# Patient Record
Sex: MALE | Race: WHITE | URBAN - METROPOLITAN AREA
[De-identification: names, ages, dates, MRNs, and addresses within clinical notes are randomized per-mention and may not be internally consistent; named-entity substitution may affect disease eponyms.]

---

## 2018-03-07 PROBLEM — R73.03 PRE-DIABETES: Status: ACTIVE | Noted: 2018-03-07

## 2018-03-07 PROBLEM — E88.810 METABOLIC SYNDROME: Status: ACTIVE | Noted: 2018-03-07

## 2019-01-14 PROBLEM — I49.3 PVC (PREMATURE VENTRICULAR CONTRACTION): Status: ACTIVE | Noted: 2019-01-14

## 2024-06-03 RX ORDER — TORSEMIDE 10 MG/1
10 TABLET ORAL DAILY
COMMUNITY

## 2024-06-03 RX ORDER — CEPHALEXIN 500 MG/1
500 CAPSULE ORAL 2 TIMES DAILY
COMMUNITY

## 2024-06-11 ENCOUNTER — HOSPITAL ENCOUNTER (OUTPATIENT)
Facility: HOSPITAL | Age: 74
Setting detail: HOSPITAL OUTPATIENT SURGERY
Discharge: HOME | End: 2024-06-11
Attending: INTERNAL MEDICINE | Admitting: INTERNAL MEDICINE
Payer: MEDICARE

## 2024-06-11 ENCOUNTER — ANESTHESIA (OUTPATIENT)
Dept: ENDOSCOPY | Facility: HOSPITAL | Age: 74
End: 2024-06-11
Payer: MEDICARE

## 2024-06-11 ENCOUNTER — ANESTHESIA EVENT (OUTPATIENT)
Dept: ENDOSCOPY | Facility: HOSPITAL | Age: 74
End: 2024-06-11
Payer: MEDICARE

## 2024-06-11 VITALS
DIASTOLIC BLOOD PRESSURE: 75 MMHG | OXYGEN SATURATION: 95 % | SYSTOLIC BLOOD PRESSURE: 98 MMHG | HEIGHT: 72 IN | WEIGHT: 245 LBS | HEART RATE: 95 BPM | BODY MASS INDEX: 33.18 KG/M2 | RESPIRATION RATE: 15 BRPM

## 2024-06-11 DIAGNOSIS — R19.7 DIARRHEA, UNSPECIFIED: ICD-10-CM

## 2024-06-11 PROCEDURE — 88312 SPECIAL STAINS GROUP 1: CPT | Performed by: INTERNAL MEDICINE

## 2024-06-11 PROCEDURE — 88342 IMHCHEM/IMCYTCHM 1ST ANTB: CPT | Performed by: INTERNAL MEDICINE

## 2024-06-11 PROCEDURE — 0DBP8ZX EXCISION OF RECTUM, VIA NATURAL OR ARTIFICIAL OPENING ENDOSCOPIC, DIAGNOSTIC: ICD-10-PCS | Performed by: INTERNAL MEDICINE

## 2024-06-11 PROCEDURE — 88341 IMHCHEM/IMCYTCHM EA ADD ANTB: CPT | Performed by: INTERNAL MEDICINE

## 2024-06-11 PROCEDURE — 0DB78ZX EXCISION OF STOMACH, PYLORUS, VIA NATURAL OR ARTIFICIAL OPENING ENDOSCOPIC, DIAGNOSTIC: ICD-10-PCS | Performed by: INTERNAL MEDICINE

## 2024-06-11 PROCEDURE — 0DB58ZX EXCISION OF ESOPHAGUS, VIA NATURAL OR ARTIFICIAL OPENING ENDOSCOPIC, DIAGNOSTIC: ICD-10-PCS | Performed by: INTERNAL MEDICINE

## 2024-06-11 PROCEDURE — 88305 TISSUE EXAM BY PATHOLOGIST: CPT | Performed by: INTERNAL MEDICINE

## 2024-06-11 RX ORDER — SODIUM CHLORIDE, SODIUM LACTATE, POTASSIUM CHLORIDE, CALCIUM CHLORIDE 600; 310; 30; 20 MG/100ML; MG/100ML; MG/100ML; MG/100ML
INJECTION, SOLUTION INTRAVENOUS CONTINUOUS
OUTPATIENT
Start: 2024-06-11

## 2024-06-11 RX ORDER — PHENYLEPHRINE HCL 10 MG/ML
VIAL (ML) INJECTION AS NEEDED
Status: DISCONTINUED | OUTPATIENT
Start: 2024-06-11 | End: 2024-06-11 | Stop reason: SURG

## 2024-06-11 RX ORDER — SODIUM CHLORIDE, SODIUM LACTATE, POTASSIUM CHLORIDE, CALCIUM CHLORIDE 600; 310; 30; 20 MG/100ML; MG/100ML; MG/100ML; MG/100ML
INJECTION, SOLUTION INTRAVENOUS CONTINUOUS
Status: DISCONTINUED | OUTPATIENT
Start: 2024-06-11 | End: 2024-06-11

## 2024-06-11 RX ORDER — NALOXONE HYDROCHLORIDE 0.4 MG/ML
0.08 INJECTION, SOLUTION INTRAMUSCULAR; INTRAVENOUS; SUBCUTANEOUS ONCE AS NEEDED
OUTPATIENT
Start: 2024-06-11 | End: 2024-06-11

## 2024-06-11 RX ORDER — EPHEDRINE SULFATE 50 MG/ML
INJECTION INTRAVENOUS AS NEEDED
Status: DISCONTINUED | OUTPATIENT
Start: 2024-06-11 | End: 2024-06-11 | Stop reason: SURG

## 2024-06-11 RX ADMIN — PHENYLEPHRINE HCL 100 MCG: 10 MG/ML VIAL (ML) INJECTION at 13:18:00

## 2024-06-11 RX ADMIN — SODIUM CHLORIDE, SODIUM LACTATE, POTASSIUM CHLORIDE, CALCIUM CHLORIDE: 600; 310; 30; 20 INJECTION, SOLUTION INTRAVENOUS at 13:17:00

## 2024-06-11 RX ADMIN — EPHEDRINE SULFATE 10 MG: 50 INJECTION INTRAVENOUS at 13:13:00

## 2024-06-11 RX ADMIN — EPHEDRINE SULFATE 10 MG: 50 INJECTION INTRAVENOUS at 13:15:00

## 2024-06-11 NOTE — DISCHARGE INSTRUCTIONS
ENDOSCOPY DISCHARGE INSTRUCTIONS    Procedure Performed:   Gastroscopy and Colonoscopy    Endoscopist: Terra Chvaira MD  FINDINGS:   Esophagitis (inflamation of the esophagus lining), Ulcer in the stomach/duodenum (small bowel), Internal/external hemorrhoids, Diverticulosis (pockets in colon that develop with age and lack of fiber intake), and colon mass    MEDICATIONS:  You may resume all other medications today - see below    DIET:  Regular - see below    BIOPSIES:  Biopsies were taken (you will be notified of results in 7-10 days)    X-RAYS:   No X-Rays were ordered today    Recommendations:   1.Low residue diet  2.Changes to medications:  -Start an acid suppressing medication (over the counter omeprazole 20 mg):  Take 1 tablet by mouth 30 minutes before breakfast and 30 minutes before dinner daily for 8 weeks then decrease to once a day  -Avoid/limit NSAIDS (ibuprofen, aspirin, aleve, motrin, and others in the family) for one week to decrease postpolypectomy bleeding risk  -Avoid/limit aspirin and aspirin containing products for one week to decrease postpolypectomy bleeding risk  3.Go to lab to get non fasting lab test  4.Call [facility] radiology to get imaging STAT    Activity for remainder of today:    REST TODAY  DO NOT drive or operate heavy machinery  DO NOT drink any alcoholic beverages  DO NOT sign any legal documents or make any important decisions    After your procedure(s):  It is not unusual to feel bloated or gassy .  Passing gas and belching is encouraged. Lying on your left side with your knees flexed may relieve the discomfort. A hot pack to the abdomen may also help.    After your gastroscopy (upper endoscopy): You may experience a slight sore throat which will subside. Throat lozenges or salt water gargle can be used.    FOLLOW-UP:  Contact the office at [phone] for follow-up appointment is needed or if you develop any of the following:    Severe abdominal pain/discomfort     Excessive bleeding                      Black tarry stool    Difficulty breathing/swallowing      Persistent nausea/vomiting  Fever above 100 degrees or chills

## 2024-06-11 NOTE — ANESTHESIA PREPROCEDURE EVALUATION
Anesthesia PreOp Note    HPI:     Anil Munoz is a 73 year old male who presents for preoperative consultation requested by: Terra Chavira MD    Date of Surgery: 6/11/2024    Procedure(s):  COLONOSCOPY  ESOPHAGOGASTRODUODENOSCOPY (EGD)  Indication: Diarrhea, unspecified    Relevant Problems   No relevant active problems       NPO:  Last Liquid Consumption Date: 06/11/24  Last Liquid Consumption Time: 0430  Last Solid Consumption Date: 06/09/24  Last Solid Consumption Time: 1800  Last Liquid Consumption Date: 06/11/24          History Review:  Patient Active Problem List    Diagnosis Date Noted    PVC (premature ventricular contraction) 01/14/2019    Pre-diabetes 03/07/2018    Metabolic syndrome 03/07/2018    Pure hypercholesterolemia 08/14/2016    Routine adult health maintenance 04/28/2016    Lymphedema 04/28/2016    Morbid obesity (HCC) 04/28/2016    Essential hypertension 12/27/2010       Past Medical History:    Calculus of kidney    Essential hypertension    High blood pressure    HIGH CHOLESTEROL    Obesity    Visual impairment       Past Surgical History:   Procedure Laterality Date    Anesth,surgery of shoulder      screw    Other surgical history      DENTAL    Other surgical history  01/01/1998    R HAND FB REMOVAL       Medications Prior to Admission   Medication Sig Dispense Refill Last Dose    torsemide 10 MG Oral Tab Take 1 tablet (10 mg total) by mouth daily.   6/10/2024    cephalexin 500 MG Oral Cap Take 1 capsule (500 mg total) by mouth 2 (two) times daily.   not taking    spironolactone 50 MG Oral Tab Take 1 tablet (50 mg total) by mouth daily. 90 tablet 3 6/8/2024    Metoprolol Succinate  MG Oral Tablet 24 Hr Take 1 tablet (200 mg total) by mouth daily. 90 tablet 3 6/10/2024    Valsartan-hydroCHLOROthiazide 320-25 MG Oral Tab Take 1 tablet by mouth daily. 90 tablet 3 6/10/2024    atorvastatin 40 MG Oral Tab Take 1 tablet (40 mg total) by mouth nightly. 90 tablet 3 6/10/2024    ASPIRIN 81  MG OR TABS 1 TABLET DAILY   6/8/2024    cloNIDine 0.2 MG Oral Tab Take 1 tablet (0.2 mg total) by mouth 2 (two) times daily. (Patient not taking: Reported on 6/3/2024) 180 tablet 3 Not Taking     Current Facility-Administered Medications Ordered in Epic   Medication Dose Route Frequency Provider Last Rate Last Admin    lactated ringers infusion   Intravenous Continuous Terra Chavira MD 20 mL/hr at 06/11/24 1146 New Bag at 06/11/24 1146     No current [facility]-ordered outpatient medications on file.       No Known Allergies    Family History   Problem Relation Age of Onset    Dementia Mother 86     Social History     Socioeconomic History    Marital status:      Spouse name: JYOTI   Occupational History    Occupation: MANAGER      Comment: [employer]    Tobacco Use    Smoking status: Some Days     Types: Cigars    Smokeless tobacco: Never    Tobacco comments:     GIGAR QD. NEVER SMOKED CIGARETTES.   Substance and Sexual Activity    Alcohol use: Yes     Alcohol/week: 0.0 standard drinks of alcohol     Comment: ONE BEER ON OCCASION. NO BINGE SINCE 2013.    Drug use: No     Comment: NEVER.    Sexual activity: Yes     Partners: Female       Available pre-op labs reviewed.             Vital Signs:  Body mass index is 33.23 kg/m².   height is 1.829 m (6') and weight is 111.1 kg (245 lb). His blood pressure is 118/78 and his pulse is 98. His respiration is 20 and oxygen saturation is 99%.   Vitals:    06/03/24 1107 06/11/24 1131   BP:  118/78   Pulse:  98   Resp:  20   SpO2:  99%   Weight: 111.1 kg (245 lb)    Height: 1.829 m (6')         Anesthesia Evaluation      No history of anesthetic complications   Airway   Mallampati: II  TM distance: >3 FB  Neck ROM: full  Dental      Pulmonary     breath sounds clear to auscultation  (-) COPD, asthma, sleep apnea, decreased breath sounds, wheezes  Cardiovascular   Exercise tolerance: good  (+) hypertension  (-) pacemaker, murmur    Rhythm: regular  Rate: normal     Neuro/Psych    (-) seizures    GI/Hepatic/Renal    (-) GERD    Endo/Other    (-) diabetes mellitus  Abdominal                  Anesthesia Plan:   ASA:  2  Plan:   MAC  Informed Consent Plan and Risks Discussed With:  Patient      I have informed Anil Munoz and/or legal guardian or family member of the nature of the anesthetic plan, benefits, risks including possible dental damage if relevant, major complications, and any alternative forms of anesthetic management.   All of the patient's questions were answered to the best of my ability. The patient desires the anesthetic management as planned.  Jl Gustafson MD  6/11/2024 11:50 AM  Present on Admission:  **None**

## 2024-06-11 NOTE — ANESTHESIA POSTPROCEDURE EVALUATION
Patient: Anil Munoz    Procedure Summary       Date: 06/11/24 Room / Location: [facility] ENDOSCOPY 05 / EM ENDOSCOPY    Anesthesia Start: 1239 Anesthesia Stop: 1330    Procedures:       COLONOSCOPY      ESOPHAGOGASTRODUODENOSCOPY (EGD) with biopsy Diagnosis:       Diarrhea, unspecified      (gastric ulcer, esophagitis, internal hemorrhoids, rectal lesion)    Surgeons: Terra Chavira MD Anesthesiologist: Jl Gustafson MD    Anesthesia Type: MAC ASA Status: 2            Anesthesia Type: MAC    Vitals Value Taken Time   /76 06/11/24 1405   Temp  06/11/24 1437   Pulse 93 06/11/24 1407   Resp 17 06/11/24 1407   SpO2 93 % 06/11/24 1407   Vitals shown include unfiled device data.    [facility] AN Post Evaluation:   Patient Evaluated in PACU  Patient Participation: complete - patient participated  Level of Consciousness: awake and alert  Pain Management: adequate  Airway Patency:patent  Dental exam unchanged from preop  Yes    Nausea/Vomiting: none  Cardiovascular Status: hemodynamically stable  Respiratory Status: room air  Postoperative Hydration euvolemic      Jl Gustafson MD  6/11/2024 2:37 PM

## 2024-06-11 NOTE — OPERATIVE REPORT
EGD/Colonoscopy Operative Report    Anil Munoz Patient Status:  Hospital Outpatient Surgery    1950 MRN [id number]   Location [facility] ENDOSCOPY LAB SUITES Attending Terra Chavira MD   Hosp Day #   0 PCP Marek Gould DO     Pre-Operative Diagnosis: Anemia, KARO    Post-Operative Diagnosis:    ESOPHAGUS:  2 cm hiatal hernia, non erosive esophagitis - biopsies taken   STOMACH:  1 cm antral ulcer - clean based - biopsies taken   DUODENUM:  normal   COLON:     1.Colon    -10 cm rectal mass with ulceration, circumscribes 3/4 of wall - multiple biopsies taken   -3-4 polyps seen: one large 1 cm pedunculated in rectum, 2 in sigmoid, one 1 cm sessile in ascending colon - none removed due to rectal mass   -left sided diverticulosis   -internal hemorrhoids    Procedure Performed:  EGD with biopsies  COLONOSCOPY with biopsies    Informed Consent: Informed consent for both the procedure and sedation were obtained from the patient. The potentially life-threatening complications of sedation, bleeding,  perforation, transfusion or repeat endoscopy were reviewed along with the possible need for hospitalization, surgical management, transfusion or repeat endoscopy should one of these complications arise. The patient understands and is agreeable to proceed.  Sedation Type: MAC-Patient received sedation with monitored anesthesia provided by an anesthesiologist    Cecum Withdrawal Time:  11 minutes    Date of previous colonoscopy: none    Procedure Description: The patient was placed in the left lateral decubitus position. A bite block was placed in the patient’s mouth. The endoscope was inserted through the mouth and advanced under direct visualization to the descending duodenum and was then withdrawn to examine the duodenal bulb and gastric antrum.  The endoscope was then retroflexed to examine the angulus, GE junction, cardia, body and fundus,  then withdrawn to  examine the esophagus. The endoscope was then removed from the patient. The patient tolerated the procedure well with no immediate complications. The patient was then repositioned for colonoscopy.  After careful digital rectal examination, the Adult colonoscope was inserted into the rectum and advanced to the level of the cecum under direct visualization. The cecum was identified by landmarks, including the appendiceal orifice and ileoceccal valve. Careful examination of the entire colon was performed during withdrawal of the endoscope. The scope was withdrawn to the rectum and retroflexion was performed.  The patient tolerated the procedure well with no immediate complications. The patient was transferred to the recovery area in stable condition.   Quality of Preparation: Adequate  Aronchick Bowel Prep Scale: 2/2/2    Findings: see above  Recommendations:   1.Low residue diet  2.Changes to medications:  -Start an acid suppressing medication (over the counter omeprazole 20 mg):  Take 1 tablet by mouth 30 minutes before breakfast and 30 minutes before dinner daily for 8 weeks then decrease to once a day  -Avoid/limit NSAIDS (ibuprofen, aspirin, aleve, motrin, and others in the family) for one week to decrease postpolypectomy bleeding risk  -Avoid/limit aspirin and aspirin containing products for one week to decrease postpolypectomy bleeding risk  3.Go to lab to get non fasting lab test  4.Call [facility] radiology to get imaging STAT  Discharge:  The patient was given an after visit summary detailing the procedure, findings, recommendations and follow up plans.    Terra Chavira MD  6/11/2024  12:55 PM

## 2024-06-11 NOTE — H&P
Anil Munoz is a 73 year old male with a PMH of gout, hlp, htn presenting with low iron levels with saturation of 7% and hbg of 9.1    Diarrhea 6-8 months: upto 6 bms a day, watery, some a night 2/2 to c diff colitis - treated    +weight loss - intentional lifestyle changes    ROS: Negative unless mentioned above in HPI:  Fevers, chills, night sweats, unintentional weight loss nor weight gain.   Nausea, vomiting.  Constipation, persistent changes in caliber of stool, hematochezia  Dysphagia, heartburn, early satiety, hematemesis, melena  Jaundice  Abdominal pain    History of obstructive sleep apnea: No  History of respiratory illness/home oxygen supplementation: No  History of cardiac illness/pacemaker/AICD/blood thinners: No  History of anxiety/depression: No  History of chronic narcotic pain medication use: No  History of diabetes: No  History of mobility issues: No  History of C-Diff Colitis:No  History of MRSA: No  History of abdominal surgeries: none    ENDOSCOPIC REPORTS:   Colonoscopy:none  EGD:none  ERCP:    History of Prep or Sedation intolerance with previous endoscopic procedures: None      Past Medical History  Past Medical History:    Calculus of kidney    Essential hypertension    High blood pressure    HIGH CHOLESTEROL    Obesity    Visual impairment       Past Surgical History  Past Surgical History:   Procedure Laterality Date    Anesth,surgery of shoulder      screw    Other surgical history      DENTAL    Other surgical history  01/01/1998    R HAND FB REMOVAL       Family History  Family History   Problem Relation Age of Onset    Dementia Mother 86       Social History  Social History     Socioeconomic History    Marital status:      Spouse name: JYOTI   Occupational History    Occupation: MANAGER      Comment: [employer]    Tobacco Use    Smoking status: Some Days     Types: Cigars    Smokeless tobacco: Never    Tobacco comments:     FLOYD QD. NEVER SMOKED CIGARETTES.    Substance and Sexual Activity    Alcohol use: Yes     Alcohol/week: 0.0 standard drinks of alcohol     Comment: ONE BEER ON OCCASION. NO BINGE SINCE 2013.    Drug use: No     Comment: NEVER.    Sexual activity: Yes     Partners: Female          Current Medications:  Current Facility-Administered Medications   Medication Dose Route Frequency    lactated ringers infusion   Intravenous Continuous     Medications Prior to Admission   Medication Sig    torsemide 10 MG Oral Tab Take 1 tablet (10 mg total) by mouth daily.    cephalexin 500 MG Oral Cap Take 1 capsule (500 mg total) by mouth 2 (two) times daily.    spironolactone 50 MG Oral Tab Take 1 tablet (50 mg total) by mouth daily.    Metoprolol Succinate  MG Oral Tablet 24 Hr Take 1 tablet (200 mg total) by mouth daily.    Valsartan-hydroCHLOROthiazide 320-25 MG Oral Tab Take 1 tablet by mouth daily.    atorvastatin 40 MG Oral Tab Take 1 tablet (40 mg total) by mouth nightly.    ASPIRIN 81 MG OR TABS 1 TABLET DAILY    cloNIDine 0.2 MG Oral Tab Take 1 tablet (0.2 mg total) by mouth 2 (two) times daily. (Patient not taking: Reported on 6/3/2024)       Allergies  No Known Allergies      Physical Exam:   Blood pressure 118/78, pulse 98, resp. rate 20, height 6' (1.829 m), weight 245 lb (111.1 kg), SpO2 99%.    General: well developed, well nourished, in no apparent distress  HEENT: Conjunctiva clear; mucous membranes moist  Neck: supple, symmetric; no cervical/supraclavicular lymphadenopathy  CV: S1, S2 normal; RRR; no pedal edema  Lungs: non-labored; CTAB  Abdomen: normoactive BS; soft; nontender, non-distended; no HSM or masses  Rectal: deferred  Neuro: no gross deficit, gait intact  Skin: warm, dry and intact  MSK: moving all extremities; no deformity  Psych: appropriate mood/affect     Results:     Laboratory Data:  Lab Results   Component Value Date    WBC 11.27 01/20/2022    HGB 13.9 01/20/2022    HCT 43.8 01/20/2022     01/20/2022    CREATSERUM 1.21  (H) 01/20/2022    BUN 21.0 (H) 01/20/2022     01/20/2022    K 4.88 01/20/2022     01/20/2022    CO2 27.1 01/20/2022     01/20/2022    CA 10.0 01/20/2022    ALB 4.3 01/20/2022    ALKPHO 94 01/20/2022    TP 6.2 (L) 01/20/2022    AST 18 01/20/2022    ALT 14 01/20/2022    TSH 1.886 07/28/2016    PSA 0.50 12/04/2017         Imaging:  No results found.     Impression:   Anil Munoz is a 73 year old male with KARO presenting for egd/oc for work up.    Special Considerations Prior to Endoscopic Exam  Sedation/Anesthesia Plan: MAC  Anticoagulants none    The risks and benefits of my recommendations, as well as other treatment options were discussed with the patient today. Questions were answered. The patient indicates understanding of these issues and agrees to the plan.    Plan for egd/oc with possible biopsies and possible polypectomy. The procedure, indications, and risks (including perforation, bleeding, infection, and sedation related complications) were discussed.     Terra Chavira MD  6/11/2024
